# Patient Record
(demographics unavailable — no encounter records)

---

## 2024-11-07 NOTE — DISCUSSION/SUMMARY

## 2024-11-07 NOTE — PHYSICAL EXAM
[de-identified] : General: Well-nourished, well-developed, alert, and in no acute distress. Head: Normocephalic. Eyes: Pupils equal, extraocular muscles intact, normal sclera. Nose: No nasal discharge. Cardiovascular: Extremities are warm and well perfused. Distal pulses are symmetric bilaterally. Respiratory: No labored breathing. Extremities: Sensation is intact distally bilaterally. Distal pulses are symmetric bilaterally Lymphatic: No regional lymphadenopathy, no lymphedema Neurologic: No focal deficits Skin: Normal skin color, texture, and turgor Psychiatric: Normal affect  MSK: C-spine demonstrates no tenderness to palpation midline, paraspinals. AROM: full and pain-free Cervical facet loading: negative Spurling's Compression: negative   Examination of left shoulder shows no overlying skin changes or muscular atrophy. There is no tenderness to palpation over the AC joint, Bicipital groove, Trapezius Range of motion shows forward elevation of 180, abduction 180, external rotation 60, and internal rotation to the mid thoracic spine. There is no pain at the end range of motion.   Special Tests: Neer's negative Hawkin's negative Dawood's negative Resisted ext rotation negative Lift off negative [place] negative Speed's negative Apprehension test negative No scapular winging (pain laterally with wall push up)   Examination of right shoulder shows no overlying skin changes or muscular atrophy. There is no tenderness to palpation over the AC joint, Bicipital groove, Trapezius   Range of motion shows forward elevation of 180, abduction 180, external rotation 60, and internal rotation to the mid thoracic spine. There is no pain at the end range of motion.   Special Tests: Neer's negative Hawkin's negative Dawood's negative Resisted ext rotation negative Lift off negative [place] negative Speed's negative Apprehension test negative No scapular winging.     Sensation is intact to light touch over the axillary, musculocutaneous, median, radial, and ulnar nerve distributions bilaterally. Capillary refill is less than two seconds. Radial pulses 2+ equal bilaterally. Strength testing shows 5/5 abduction, 5/5 external rotation, 5/5 internal rotation, 5/5 biceps, 5/5 triceps. 5/5 wrist extension, 5/5 intrinsics. Reflexes 2+ biceps, brachioradialis.    [de-identified] : Date: 11/05/2024 Location: [facility] Body part: XRAY B/L SHOULDER independently reviewed and interpreted by me. Impression: There is no evidence of fracture or dislocation. The joint spaces are preserved.

## 2024-11-07 NOTE — PHYSICAL EXAM
[de-identified] : General: Well-nourished, well-developed, alert, and in no acute distress. Head: Normocephalic. Eyes: Pupils equal, extraocular muscles intact, normal sclera. Nose: No nasal discharge. Cardiovascular: Extremities are warm and well perfused. Distal pulses are symmetric bilaterally. Respiratory: No labored breathing. Extremities: Sensation is intact distally bilaterally. Distal pulses are symmetric bilaterally Lymphatic: No regional lymphadenopathy, no lymphedema Neurologic: No focal deficits Skin: Normal skin color, texture, and turgor Psychiatric: Normal affect  MSK: C-spine demonstrates no tenderness to palpation midline, paraspinals. AROM: full and pain-free Cervical facet loading: negative Spurling's Compression: negative   Examination of left shoulder shows no overlying skin changes or muscular atrophy. There is no tenderness to palpation over the AC joint, Bicipital groove, Trapezius Range of motion shows forward elevation of 180, abduction 180, external rotation 60, and internal rotation to the mid thoracic spine. There is no pain at the end range of motion.   Special Tests: Neer's negative Hawkin's negative Dawood's negative Resisted ext rotation negative Lift off negative [place] negative Speed's negative Apprehension test negative No scapular winging (pain laterally with wall push up)   Examination of right shoulder shows no overlying skin changes or muscular atrophy. There is no tenderness to palpation over the AC joint, Bicipital groove, Trapezius   Range of motion shows forward elevation of 180, abduction 180, external rotation 60, and internal rotation to the mid thoracic spine. There is no pain at the end range of motion.   Special Tests: Neer's negative Hawkin's negative Dawood's negative Resisted ext rotation negative Lift off negative [place] negative Speed's negative Apprehension test negative No scapular winging.     Sensation is intact to light touch over the axillary, musculocutaneous, median, radial, and ulnar nerve distributions bilaterally. Capillary refill is less than two seconds. Radial pulses 2+ equal bilaterally. Strength testing shows 5/5 abduction, 5/5 external rotation, 5/5 internal rotation, 5/5 biceps, 5/5 triceps. 5/5 wrist extension, 5/5 intrinsics. Reflexes 2+ biceps, brachioradialis.    [de-identified] : Date: 11/05/2024 Location: [facility] Body part: XRAY B/L SHOULDER independently reviewed and interpreted by me. Impression: There is no evidence of fracture or dislocation. The joint spaces are preserved.

## 2024-11-07 NOTE — ASSESSMENT
[FreeTextEntry1] : ADDY BASHIR is a 44 year old male with left shoulder pain. I discussed with the patient that their symptoms, signs, and imaging are most consistent with rotator cuff tendinopathy. We reviewed the natural history of this condition and treatment options. We agreed on the following plan:   XR taken and reviewed with patient today. Activity modification Start Home Exercises for shoulder stretching and strengthening (Neer protocol). Demonstration, resistance band and handout provided. Physical therapy. Referral provided. Medication: continue with diclofenac, naproxen or ibuprofen as needed. Can also try diclofenac gel, PRN. Advanced imaging: consider MRI if no symptomatic improvement. Follow up in 6 weeks.

## 2024-11-07 NOTE — HISTORY OF PRESENT ILLNESS
[de-identified] : ADDY BASHIR is a 44 year old male who reports left shoulder pain and limited range of motion for several months. 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 Reports pain fluctuates and radiates down to the elbow. Denies numbness/tingling down arm, but reports painful region is "strangely numb and painful at the same time." Has taken diclofenac for pain w/o significant relief, no other treatment at this time. Exercises semi-regularly and is right-handed, overall healthy.

## 2024-11-07 NOTE — END OF VISIT
[FreeTextEntry3] :   Documented by Hortencia Paulino acting as a scribe for Dr. Deepthi Aranda. 11/05/2024   All medical record entries made by the Hortencia Paulino (Scribe) were at my, Dr. Deepthi Aranda, direction and personally dictated by me on 11/05/2024. I have reviewed the chart and agree that the record accurately reflects my personal performance of the history, physical exam, assessment and plan. I have also personally directed, reviewed, and agreed with the chart. [Time Spent: ___ minutes] : I have spent [unfilled] minutes of time on the encounter which excludes teaching and separately reported services.

## 2024-11-07 NOTE — DISCUSSION/SUMMARY

## 2024-11-07 NOTE — HISTORY OF PRESENT ILLNESS
[de-identified] : ADDY BASHIR is a 44 year old male who reports left shoulder pain and limited range of motion for several months. 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 Reports pain fluctuates and radiates down to the elbow. Denies numbness/tingling down arm, but reports painful region is "strangely numb and painful at the same time." Has taken diclofenac for pain w/o significant relief, no other treatment at this time. Exercises semi-regularly and is right-handed, overall healthy.

## 2024-11-07 NOTE — ADDENDUM
[FreeTextEntry1] :   Documented by Hortencia Paulino acting as a scribe for Dr. Deepthi Aranda. 11/05/2024